# Patient Record
(demographics unavailable — no encounter records)

---

## 2025-04-03 NOTE — REVIEW OF SYSTEMS
[Abdominal Pain] : abdominal pain [Heartburn] : heartburn [Negative] : Heme/Lymph [Fever] : no fever [Chills] : no chills [Constipation] : no constipation [Vomiting] : no vomiting

## 2025-04-03 NOTE — HEALTH RISK ASSESSMENT
[Little interest or pleasure doing things] : 1) Little interest or pleasure doing things [Feeling down, depressed, or hopeless] : 2) Feeling down, depressed, or hopeless [0] : 2) Feeling down, depressed, or hopeless: Not at all (0) [Never] : Never [Very Good] : ~his/her~  mood as very good [Yes] : Yes [Monthly or less (1 pt)] : Monthly or less (1 point) [3 or 4 (1 pt)] : 3 or 4  (1 point) [Never (0 pts)] : Never (0 points) [No falls in past year] : Patient reported no falls in the past year [Patient reported PAP Smear was abnormal] : Patient reported PAP Smear was abnormal [HIV Test offered] : HIV Test offered [Hepatitis C test offered] : Hepatitis C test offered [None] : None [With Family] : lives with family [Employed] : employed [Fully functional (bathing, dressing, toileting, transferring, walking, feeding)] : Fully functional (bathing, dressing, toileting, transferring, walking, feeding) [Fully functional (using the telephone, shopping, preparing meals, housekeeping, doing laundry, using] : Fully functional and needs no help or supervision to perform IADLs (using the telephone, shopping, preparing meals, housekeeping, doing laundry, using transportation, managing medications and managing finances) [Smoke Detector] : smoke detector [Safety elements used in home] : safety elements used in home [Seat Belt] :  uses seat belt [No] : In the past 12 months have you used drugs other than those required for medical reasons? No [PHQ-2 Negative - No further assessment needed] : PHQ-2 Negative - No further assessment needed [Time Spent: ___ Minutes] : I spent [unfilled] minutes performing a depression screening for this patient. [NO] : No [[id number]] : 0 [Change in mental status noted] : No change in mental status noted [Language] : denies difficulty with language [Behavior] : denies difficulty with behavior [Learning/Retaining New Information] : denies difficulty learning/retaining new information [Handling Complex Tasks] : denies difficulty handling complex tasks [Reasoning] : denies difficulty with reasoning [Spatial Ability and Orientation] : denies difficulty with spatial ability and orientation [Reports changes in hearing] : Reports no changes in hearing [Reports changes in vision] : Reports no changes in vision [PapSmearDate] : 2023 [PapSmearComments] : pt reports she was told to get repeat in 1 year  [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

## 2025-04-03 NOTE — END OF VISIT
[] : Resident [FreeTextEntry3] : 22F w/hx H Pylori partially treated, HLD here for CPE and concerns.  HCM - labs today, needs pap, immunizations reviewed. H. Pylori - did not complete tx 2 years ago, will retest today and prescribe new regimen

## 2025-04-03 NOTE — HISTORY OF PRESENT ILLNESS
[FreeTextEntry1] : pt here for physical checkup [de-identified] : Pt is a 23F with PMHx of H pylori and HLD presenting today for CPE. Pt reports having abdominal and back pain. Pt states that pain is worse after eating about 1h after eating, pt can't identify other causes that make it worse or better. Pain lasts 30 min located in the epigastric area No pain at night, however, gets worse when pt bends over.  No diarrhea, constipation dysuria, blood in urine or stools. Pt reports not taking the full 2-week course, probably only 1 week and never getting a follow up breath test to test for h pylori

## 2025-04-03 NOTE — HEALTH RISK ASSESSMENT
[Little interest or pleasure doing things] : 1) Little interest or pleasure doing things [Feeling down, depressed, or hopeless] : 2) Feeling down, depressed, or hopeless [0] : 2) Feeling down, depressed, or hopeless: Not at all (0) [Never] : Never [Very Good] : ~his/her~  mood as very good [Yes] : Yes [Monthly or less (1 pt)] : Monthly or less (1 point) [3 or 4 (1 pt)] : 3 or 4  (1 point) [Never (0 pts)] : Never (0 points) [No falls in past year] : Patient reported no falls in the past year [Patient reported PAP Smear was abnormal] : Patient reported PAP Smear was abnormal [HIV Test offered] : HIV Test offered [Hepatitis C test offered] : Hepatitis C test offered [None] : None [With Family] : lives with family [Employed] : employed [Fully functional (bathing, dressing, toileting, transferring, walking, feeding)] : Fully functional (bathing, dressing, toileting, transferring, walking, feeding) [Fully functional (using the telephone, shopping, preparing meals, housekeeping, doing laundry, using] : Fully functional and needs no help or supervision to perform IADLs (using the telephone, shopping, preparing meals, housekeeping, doing laundry, using transportation, managing medications and managing finances) [Smoke Detector] : smoke detector [Safety elements used in home] : safety elements used in home [Seat Belt] :  uses seat belt [No] : In the past 12 months have you used drugs other than those required for medical reasons? No [PHQ-2 Negative - No further assessment needed] : PHQ-2 Negative - No further assessment needed [Time Spent: ___ Minutes] : I spent [unfilled] minutes performing a depression screening for this patient. [NO] : No [[id number]] : 0 [Change in mental status noted] : No change in mental status noted [Language] : denies difficulty with language [Behavior] : denies difficulty with behavior [Learning/Retaining New Information] : denies difficulty learning/retaining new information [Handling Complex Tasks] : denies difficulty handling complex tasks [Reasoning] : denies difficulty with reasoning [Spatial Ability and Orientation] : denies difficulty with spatial ability and orientation [Reports changes in hearing] : Reports no changes in hearing [Reports changes in vision] : Reports no changes in vision [PapSmearDate] : 2023 [PapSmearComments] : pt reports she was told to get repeat in 1 year  [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

## 2025-04-03 NOTE — HISTORY OF PRESENT ILLNESS
[FreeTextEntry1] : pt here for physical checkup [de-identified] : Pt is a 23F with PMHx of H pylori and HLD presenting today for CPE. Pt reports having abdominal and back pain. Pt states that pain is worse after eating about 1h after eating, pt can't identify other causes that make it worse or better. Pain lasts 30 min located in the epigastric area No pain at night, however, gets worse when pt bends over.  No diarrhea, constipation dysuria, blood in urine or stools. Pt reports not taking the full 2-week course, probably only 1 week and never getting a follow up breath test to test for h pylori

## 2025-04-03 NOTE — ASSESSMENT
[Vaccines Reviewed] : Immunizations reviewed today. Please see immunization details in the vaccine log within the immunization flowsheet.  [FreeTextEntry1] : Pt is a 23F with PMHx of H pylori and HLD presenting today for CPE.    #GERD #H Pylori Pt reports having abdominal and back pain. Pt states that pain is worse after eating about 1h after eating, Pain lasts 30 min located in the epigastric area No pain at night, however, gets worse when pt bends over.  No diarrhea, constipation dysuria, blood in urine or stools. Pt reports not taking the full 2-week course, probably only 1 week (Gi side effects) and never getting a follow up breath test to test for h pylori.  - repeat H pylori breath test today   #HLD Pt with PMHx of HLD, no Lipids on record.  - Lipid panel today   #HCM  - Obgyn referral for PAP and OCP ([facility]) -  HIV, HCV screening   Case discussed with Dr. Alegre

## 2025-07-08 NOTE — REVIEW OF SYSTEMS
[Back Pain] : back pain [Fever] : no fever [Chills] : no chills [Chest Pain] : no chest pain [Palpitations] : no palpitations [Shortness Of Breath] : no shortness of breath [Abdominal Pain] : no abdominal pain [Nausea] : no nausea [Constipation] : no constipation [Diarrhea] : diarrhea [Vomiting] : no vomiting [Heartburn] : no heartburn [Dysuria] : no dysuria [Joint Pain] : no joint pain [Muscle Pain] : no muscle pain

## 2025-07-08 NOTE — PHYSICAL EXAM
[No Acute Distress] : no acute distress [Well Nourished] : well nourished [Well Developed] : well developed [Normal Sclera/Conjunctiva] : normal sclera/conjunctiva [No JVD] : no jugular venous distention [Supple] : supple [No Respiratory Distress] : no respiratory distress  [No Accessory Muscle Use] : no accessory muscle use [Clear to Auscultation] : lungs were clear to auscultation bilaterally [Normal Rate] : normal rate  [Regular Rhythm] : with a regular rhythm [Normal S1, S2] : normal S1 and S2 [Soft] : abdomen soft [Non Tender] : non-tender [Non-distended] : non-distended [Normal Bowel Sounds] : normal bowel sounds [No CVA Tenderness] : no CVA  tenderness [No Spinal Tenderness] : no spinal tenderness [Coordination Grossly Intact] : coordination grossly intact [No Focal Deficits] : no focal deficits [Normal Gait] : normal gait [Normal Affect] : the affect was normal [Normal Insight/Judgement] : insight and judgment were intact [de-identified] : mild tenderness to palpation of left lumbar region, straight leg test negative bilaterally

## 2025-07-08 NOTE — HEALTH RISK ASSESSMENT
[Little interest or pleasure doing things] : 1) Little interest or pleasure doing things [Feeling down, depressed, or hopeless] : 2) Feeling down, depressed, or hopeless [0] : 2) Feeling down, depressed, or hopeless: Not at all (0) [PHQ-9 Negative - No further assessment needed] : PHQ-9 Negative - No further assessment needed [Never] : Never [[id number]] : 0

## 2025-07-08 NOTE — HISTORY OF PRESENT ILLNESS
[FreeTextEntry1] : Follow up [de-identified] :  ID: [id number] Sheeba is a 23-year-old F with PMHx H. pylori & HLD presents for follow-up for H. pylori after completion of treatment. Patient finished taking all 4 medications for 2 weeks, completed course around May. She is now back for repeat testing. Denies any discomfort eating or dyspepsia. Also reports lower back pain that started 3 months ago, started worsening 1 month ago. 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 Has not tried any medications for the pain. Patient goes to the gym on weekdays daily, works out gluteal, abdomen, legs & arms - also including weightlifting. Denies fever/chills, shortness of breath, chest pain, nausea, vomiting and abdominal pain. No changes to urinary or bowel habits.

## 2025-07-08 NOTE — ASSESSMENT
[FreeTextEntry1] : 23-year-old F with PMHx H. pylori & HLD presents for follow-up for H. pylori after completion of treatment. Patient also complaining of worsening back pain. Repeat urea breath test today for eradication, prescribed diclofenac gel for lower back pain. POCT pregnancy test done in office today, refilled birth control, and gave gynecology referral for pap smear.  Case discussed with Dr. Mahan.